# Patient Record
Sex: MALE | Race: OTHER | URBAN - NONMETROPOLITAN AREA
[De-identification: names, ages, dates, MRNs, and addresses within clinical notes are randomized per-mention and may not be internally consistent; named-entity substitution may affect disease eponyms.]

---

## 2021-12-01 NOTE — PATIENT DISCUSSION
The patient understands that this portion is cosmetic and will schedule surgery at their convenience.

## 2022-10-21 ENCOUNTER — CONSULTATION/EVALUATION (OUTPATIENT)
Facility: LOCATION | Age: 82
End: 2022-10-21

## 2022-10-21 DIAGNOSIS — H25.813: ICD-10-CM

## 2022-10-21 PROCEDURE — 99204 OFFICE O/P NEW MOD 45 MIN: CPT

## 2022-10-21 PROCEDURE — 92136 OPHTHALMIC BIOMETRY: CPT

## 2022-10-21 ASSESSMENT — VISUAL ACUITY
OU_CC: J1
OS_SC: J2
OD_CC: J1
OD_CC: 20/50-1
OS_CC: J1
OS_SC: 20/60
OU_SC: J2
OU_SC: 20/60
OD_SC: J2
OD_BAT: 20/400
OU_CC: 20/50+2
OS_BAT: <20/400
OS_CC: 20/40-2
OD_SC: 20/60

## 2022-10-21 ASSESSMENT — TONOMETRY
OS_IOP_MMHG: 11
OD_IOP_MMHG: 10

## 2023-01-06 ENCOUNTER — SURGERY/PROCEDURE (OUTPATIENT)
Facility: LOCATION | Age: 83
End: 2023-01-06

## 2023-01-06 DIAGNOSIS — H25.812: ICD-10-CM

## 2023-01-06 PROCEDURE — 6698454 REMOVE CATARACT;INSERT LENS (SX ONLY)

## 2023-01-27 ENCOUNTER — SURGERY/PROCEDURE (OUTPATIENT)
Facility: LOCATION | Age: 83
End: 2023-01-27

## 2023-01-27 DIAGNOSIS — H25.811: ICD-10-CM

## 2023-01-27 PROCEDURE — 6698454 REMOVE CATARACT;INSERT LENS (SX ONLY)
